# Patient Record
Sex: MALE | Race: ASIAN | NOT HISPANIC OR LATINO
[De-identification: names, ages, dates, MRNs, and addresses within clinical notes are randomized per-mention and may not be internally consistent; named-entity substitution may affect disease eponyms.]

---

## 2019-09-20 ENCOUNTER — TRANSCRIPTION ENCOUNTER (OUTPATIENT)
Age: 59
End: 2019-09-20

## 2021-09-25 ENCOUNTER — TRANSCRIPTION ENCOUNTER (OUTPATIENT)
Age: 61
End: 2021-09-25

## 2023-07-01 ENCOUNTER — NON-APPOINTMENT (OUTPATIENT)
Age: 63
End: 2023-07-01

## 2024-03-18 ENCOUNTER — NON-APPOINTMENT (OUTPATIENT)
Age: 64
End: 2024-03-18

## 2024-03-18 ENCOUNTER — APPOINTMENT (OUTPATIENT)
Dept: ORTHOPEDIC SURGERY | Facility: CLINIC | Age: 64
End: 2024-03-18
Payer: COMMERCIAL

## 2024-03-18 VITALS
HEART RATE: 82 BPM | HEIGHT: 69 IN | DIASTOLIC BLOOD PRESSURE: 96 MMHG | WEIGHT: 220 LBS | SYSTOLIC BLOOD PRESSURE: 147 MMHG | BODY MASS INDEX: 32.58 KG/M2

## 2024-03-18 DIAGNOSIS — M54.9 DORSALGIA, UNSPECIFIED: ICD-10-CM

## 2024-03-18 DIAGNOSIS — G89.29 DORSALGIA, UNSPECIFIED: ICD-10-CM

## 2024-03-18 PROBLEM — Z00.00 ENCOUNTER FOR PREVENTIVE HEALTH EXAMINATION: Status: ACTIVE | Noted: 2024-03-18

## 2024-03-18 PROCEDURE — 72110 X-RAY EXAM L-2 SPINE 4/>VWS: CPT

## 2024-03-18 PROCEDURE — 99204 OFFICE O/P NEW MOD 45 MIN: CPT

## 2024-03-18 PROCEDURE — 72170 X-RAY EXAM OF PELVIS: CPT

## 2024-03-18 RX ORDER — DICLOFENAC SODIUM 50 MG/1
50 TABLET, DELAYED RELEASE ORAL
Qty: 30 | Refills: 2 | Status: ACTIVE | COMMUNITY
Start: 2024-03-18 | End: 1900-01-01

## 2024-03-18 RX ORDER — GABAPENTIN 300 MG/1
300 CAPSULE ORAL
Qty: 30 | Refills: 2 | Status: ACTIVE | COMMUNITY
Start: 2024-03-18 | End: 1900-01-01

## 2024-03-18 NOTE — DISCUSSION/SUMMARY
[Medication Risks Reviewed] : Medication risks reviewed [de-identified] : X-rays obtained today and MRI lumbar spine performed previously at an outside facility was reviewed with the patient and his wife today.  He has multilevel disc protrusions at L2-3 and L3-4 with primarily at L5-S1.  Given his radicular pain pattern we will will consider left L5-S1 microdiscectomy, understands he also has pathology at left L2-3, L3-4.  However he is not interested in any surgery at this time. PT Rx provided continue gabapentin.  Prescription for diclofenac and gabapentin provided today.  Recommended follow-up in 3 months on an as-needed basis.

## 2024-03-18 NOTE — PHYSICAL EXAM
[Stooped] : stooped [LE] : Sensory: Intact in bilateral lower extremities [0] : left ankle jerk 0 [SLR] : negative straight leg raise [Plantar Reflex Right Only] : absent on the right [Plantar Reflex Left Only] : absent on the left [DTR Reflexes Clonus Of Right Ankle (___ Beats)] : absent on the right [DTR Reflexes Clonus Of Left Ankle (___ Beats)] : absent on the left [Obese] : obese [de-identified] : seen with his wife The pt is awake, alert and oriented to self, place and time, is comfortable and in no acute distress. Inspection of neck, back and lower extremities bilaterally reveals no rashes or ecchymotic lesions.  There is no obvious abnormal spinal curvature in the sagittal and coronal planes. There is no tenderness over the cervical, thoracic or lumbar spine, or the paraspinal or upper and lower extremities musculature. There is no sacroiliac tenderness. No greater trochanteric tenderness bilaterally. No atrophy or abnormal movements noted in the upper or lower extremities. There is no swelling noted in the upper or lower extremities bilaterally. No cervical lymphadenopathy noted anteriorly. No joint laxity noted in the upper and lower extremity joints bilaterally. Hip range of motion is degrees internal rotation 30 external rotation without pain. Full range of motion of the shoulders bilaterally with no significant pain There is no groin pain with hip internal rotation and a negative WILLIAM test bilaterally.  [de-identified] : He can forward flex to his knees and extend 30 degrees with discomfort [de-identified] : 4 views lumbar spine obtained today demonstrate no significant scoliosis.  Normal lumbar lordosis with loss of disc height at L5-S1.  Trace retrolisthesis at L3-4.  No dynamic instability between flexion-extension.  No obvious acute fractures  AP pelvis demonstrates normal appearance of the hips bilaterally.  No acute fractures.  No significant degeneration.

## 2024-03-18 NOTE — HISTORY OF PRESENT ILLNESS
[8] : a current pain level of 8/10 [Sitting] : sitting [Daily] : ~He/She~ states the symptoms seem to be occuring daily [Rest] : relieved by rest [de-identified] : Patient is here today for evaluation on his chronic low back left leg pain going on for the past 4-5 years no known injury. Patient been seeing Pain management since his pain. Patient had 5 lumbar epidirural injections last one was 2/8/24 gave him only one month of relief. Patient also had mri lumbar spine at [facility] has the report on his phone. Has numbness along bilateral lateral feet Works in IT, sitting mostly [Worsening] : worsening [___ yrs] : [unfilled] year(s) ago [de-identified] : sitting to standing  [de-identified] : oxycodone as needed

## 2024-03-22 DIAGNOSIS — M51.26 OTHER INTERVERTEBRAL DISC DISPLACEMENT, LUMBAR REGION: ICD-10-CM

## 2024-03-22 DIAGNOSIS — M54.16 RADICULOPATHY, LUMBAR REGION: ICD-10-CM
